# Patient Record
Sex: FEMALE | Race: WHITE | Employment: OTHER | URBAN - METROPOLITAN AREA
[De-identification: names, ages, dates, MRNs, and addresses within clinical notes are randomized per-mention and may not be internally consistent; named-entity substitution may affect disease eponyms.]

---

## 2017-10-31 PROBLEM — M48.02 CERVICAL STENOSIS OF SPINAL CANAL: Status: ACTIVE | Noted: 2017-10-31

## 2017-10-31 PROBLEM — M54.12 CERVICAL RADICULOPATHY: Status: ACTIVE | Noted: 2017-10-31

## 2017-11-10 ENCOUNTER — HOSPITAL ENCOUNTER (OUTPATIENT)
Dept: SURGERY | Age: 37
Discharge: HOME | End: 2017-11-10
Payer: COMMERCIAL

## 2017-11-10 VITALS
OXYGEN SATURATION: 98 % | RESPIRATION RATE: 16 BRPM | HEIGHT: 64 IN | DIASTOLIC BLOOD PRESSURE: 69 MMHG | SYSTOLIC BLOOD PRESSURE: 109 MMHG | HEART RATE: 77 BPM | BODY MASS INDEX: 23.27 KG/M2 | WEIGHT: 136.3 LBS | TEMPERATURE: 98.2 F

## 2017-11-10 LAB
ANION GAP SERPL CALC-SCNC: 7 MMOL/L (ref 7–16)
APPEARANCE UR: CLEAR
BACTERIA SPEC CULT: NORMAL
BASOPHILS # BLD: 0 K/UL (ref 0–0.2)
BASOPHILS NFR BLD: 0 % (ref 0–2)
BILIRUB UR QL: NEGATIVE
BUN SERPL-MCNC: 15 MG/DL (ref 6–23)
CALCIUM SERPL-MCNC: 8.1 MG/DL (ref 8.3–10.4)
CHLORIDE SERPL-SCNC: 107 MMOL/L (ref 98–107)
CO2 SERPL-SCNC: 29 MMOL/L (ref 21–32)
COLOR UR: YELLOW
CREAT SERPL-MCNC: 0.83 MG/DL (ref 0.6–1)
DIFFERENTIAL METHOD BLD: NORMAL
EOSINOPHIL # BLD: 0.1 K/UL (ref 0–0.8)
EOSINOPHIL NFR BLD: 2 % (ref 0.5–7.8)
ERYTHROCYTE [DISTWIDTH] IN BLOOD BY AUTOMATED COUNT: 12.2 % (ref 11.9–14.6)
GLUCOSE SERPL-MCNC: 83 MG/DL (ref 65–100)
GLUCOSE UR STRIP.AUTO-MCNC: NEGATIVE MG/DL
HCT VFR BLD AUTO: 40 % (ref 35.8–46.3)
HGB BLD-MCNC: 13.4 G/DL (ref 11.7–15.4)
HGB UR QL STRIP: NEGATIVE
IMM GRANULOCYTES # BLD: 0 K/UL (ref 0–0.5)
IMM GRANULOCYTES NFR BLD AUTO: 0 % (ref 0–5)
KETONES UR QL STRIP.AUTO: NEGATIVE MG/DL
LEUKOCYTE ESTERASE UR QL STRIP.AUTO: NEGATIVE
LYMPHOCYTES # BLD: 2.4 K/UL (ref 0.5–4.6)
LYMPHOCYTES NFR BLD: 40 % (ref 13–44)
MCH RBC QN AUTO: 30.3 PG (ref 26.1–32.9)
MCHC RBC AUTO-ENTMCNC: 33.5 G/DL (ref 31.4–35)
MCV RBC AUTO: 90.5 FL (ref 79.6–97.8)
MONOCYTES # BLD: 0.4 K/UL (ref 0.1–1.3)
MONOCYTES NFR BLD: 6 % (ref 4–12)
NEUTS SEG # BLD: 3.1 K/UL (ref 1.7–8.2)
NEUTS SEG NFR BLD: 52 % (ref 43–78)
NITRITE UR QL STRIP.AUTO: NEGATIVE
PH UR STRIP: 7 [PH] (ref 5–9)
PLATELET # BLD AUTO: 209 K/UL (ref 150–450)
PMV BLD AUTO: 11.2 FL (ref 10.8–14.1)
POTASSIUM SERPL-SCNC: 4.2 MMOL/L (ref 3.5–5.1)
PROT UR STRIP-MCNC: NEGATIVE MG/DL
RBC # BLD AUTO: 4.42 M/UL (ref 4.05–5.25)
SERVICE CMNT-IMP: NORMAL
SODIUM SERPL-SCNC: 143 MMOL/L (ref 136–145)
SP GR UR REFRACTOMETRY: 1.02 (ref 1–1.02)
UROBILINOGEN UR QL STRIP.AUTO: 0.2 EU/DL (ref 0.2–1)
WBC # BLD AUTO: 6 K/UL (ref 4.3–11.1)

## 2017-11-10 PROCEDURE — 87641 MR-STAPH DNA AMP PROBE: CPT | Performed by: NEUROLOGICAL SURGERY

## 2017-11-10 PROCEDURE — 85025 COMPLETE CBC W/AUTO DIFF WBC: CPT | Performed by: NEUROLOGICAL SURGERY

## 2017-11-10 PROCEDURE — 80048 BASIC METABOLIC PNL TOTAL CA: CPT | Performed by: NEUROLOGICAL SURGERY

## 2017-11-10 PROCEDURE — 81003 URINALYSIS AUTO W/O SCOPE: CPT | Performed by: NEUROLOGICAL SURGERY

## 2017-11-10 PROCEDURE — 77030027138 HC INCENT SPIROMETER -A

## 2017-11-10 RX ORDER — IBUPROFEN 200 MG
200 TABLET ORAL AS NEEDED
COMMUNITY

## 2017-11-10 NOTE — PERIOP NOTES
Patient verified name, , and surgery as listed in [facility]. TYPE  CASE:2              Orders:  received  Labs per Spine protocol:  CBC/BMP/UA/MRSA   Labs per anesthesia protocol: None needed   EKG/cardiac records  :  None needed     Instructed patient to continue previous medications as prescribed prior to surgery and  to take the following medications the day of surgery according to anesthesia guidelines with a small sip of water, med bottles NOT visualized : NONE       Continue all previous medications unless otherwise directed. Instructed patient to hold  the following medications prior to surgery: IBUPROFEN, ALL VITAMINS/SUPPLEMENTS     Pt viewed Spine Pre-hab video. All further questions were addressed. Pt was provided with antibacterial soap, Hibiclens, long-handled sponge, Spine Recovery booklet and incentive spirometer. Pt correctly demonstrated use of incentive spirometer and instruction to bring it to the hospital on day of surgery. Handouts and all Surgery instructions provided to pt and pt verbalizes understanding. Patient instructed on the following and verbalized understanding:  Arrive at MAIN entrance, time of arrival to be called the day before by 1700. Responsible adult must drive patient to and from hospital, stay during surgery and 24 hours postoperatively. Npo after midnight including gum, mints and ice chips. Use hibiclens in the shower the night before and the morning of surgery. Leave all valuables at home. Instructed on no jewelry or body piercings on the dos. Bring insurance card and ID. No perfumes, oil, powder, colognes, makeup or  lotions on the skin. Patient verbalized understanding of all instructions and provided all medical/health information to the best of their ability.        Pt teach back was successful and pt demonstrates knowledge of instruction

## 2017-11-10 NOTE — PERIOP NOTES
Recent Results (from the past 12 hour(s))   URINALYSIS W/ RFLX MICROSCOPIC    Collection Time: 11/10/17 10:08 AM   Result Value Ref Range    Color YELLOW      Appearance CLEAR      Specific gravity 1.025 (H) 1.001 - 1.023      pH (UA) 7.0 5.0 - 9.0      Protein NEGATIVE  NEG mg/dL    Glucose NEGATIVE  mg/dL    Ketone NEGATIVE  NEG mg/dL    Bilirubin NEGATIVE  NEG      Blood NEGATIVE  NEG      Urobilinogen 0.2 0.2 - 1.0 EU/dL    Nitrites NEGATIVE  NEG      Leukocyte Esterase NEGATIVE  NEG     CBC WITH AUTOMATED DIFF    Collection Time: 11/10/17 10:21 AM   Result Value Ref Range    WBC 6.0 4.3 - 11.1 K/uL    RBC 4.42 4.05 - 5.25 M/uL    HGB 13.4 11.7 - 15.4 g/dL    HCT 40.0 35.8 - 46.3 %    MCV 90.5 79.6 - 97.8 FL    MCH 30.3 26.1 - 32.9 PG    MCHC 33.5 31.4 - 35.0 g/dL    RDW 12.2 11.9 - 14.6 %    PLATELET 116 590 - 335 K/uL    MPV 11.2 10.8 - 14.1 FL    DF AUTOMATED      NEUTROPHILS 52 43 - 78 %    LYMPHOCYTES 40 13 - 44 %    MONOCYTES 6 4.0 - 12.0 %    EOSINOPHILS 2 0.5 - 7.8 %    BASOPHILS 0 0.0 - 2.0 %    IMMATURE GRANULOCYTES 0 0.0 - 5.0 %    ABS. NEUTROPHILS 3.1 1.7 - 8.2 K/UL    ABS. LYMPHOCYTES 2.4 0.5 - 4.6 K/UL    ABS. MONOCYTES 0.4 0.1 - 1.3 K/UL    ABS. EOSINOPHILS 0.1 0.0 - 0.8 K/UL    ABS. BASOPHILS 0.0 0.0 - 0.2 K/UL    ABS. IMM. GRANS. 0.0 0.0 - 0.5 K/UL   METABOLIC PANEL, BASIC    Collection Time: 11/10/17 10:21 AM   Result Value Ref Range    Sodium 143 136 - 145 mmol/L    Potassium 4.2 3.5 - 5.1 mmol/L    Chloride 107 98 - 107 mmol/L    CO2 29 21 - 32 mmol/L    Anion gap 7 7 - 16 mmol/L    Glucose 83 65 - 100 mg/dL    BUN 15 6 - 23 MG/DL    Creatinine 0.83 0.6 - 1.0 MG/DL    GFR est AA >60 >60 ml/min/1.73m2    GFR est non-AA >60 >60 ml/min/1.73m2    Calcium 8.1 (L) 8.3 - 10.4 MG/DL   MSSA/MRSA SC BY PCR, NASAL SWAB    Collection Time: 11/10/17 10:21 AM   Result Value Ref Range    Special Requests: NO SPECIAL REQUESTS      Culture result:        SA target not detected. A MRSA NEGATIVE, SA NEGATIVE test result does not preclude MRSA or SA nasal colonization.

## 2017-11-14 ENCOUNTER — ANESTHESIA EVENT (OUTPATIENT)
Dept: SURGERY | Age: 37
End: 2017-11-14
Payer: COMMERCIAL

## 2017-11-15 ENCOUNTER — HOSPITAL ENCOUNTER (OUTPATIENT)
Age: 37
Discharge: HOME | End: 2017-11-16
Attending: NEUROLOGICAL SURGERY | Admitting: NEUROLOGICAL SURGERY
Payer: COMMERCIAL

## 2017-11-15 ENCOUNTER — APPOINTMENT (OUTPATIENT)
Dept: GENERAL RADIOLOGY | Age: 37
End: 2017-11-15
Attending: NEUROLOGICAL SURGERY
Payer: COMMERCIAL

## 2017-11-15 ENCOUNTER — ANESTHESIA (OUTPATIENT)
Dept: SURGERY | Age: 37
End: 2017-11-15
Payer: COMMERCIAL

## 2017-11-15 DIAGNOSIS — M48.02 CERVICAL STENOSIS OF SPINAL CANAL: Primary | ICD-10-CM

## 2017-11-15 PROCEDURE — 74011000250 HC RX REV CODE- 250: Performed by: ANESTHESIOLOGY

## 2017-11-15 PROCEDURE — 77030004391 HC BUR FLUT MEDT -C: Performed by: NEUROLOGICAL SURGERY

## 2017-11-15 PROCEDURE — 74011000250 HC RX REV CODE- 250

## 2017-11-15 PROCEDURE — 77030009868 HC PIN DISTR CASPR AESC -B: Performed by: NEUROLOGICAL SURGERY

## 2017-11-15 PROCEDURE — 77030026405 HC CGE ANT CERV ROIC ZIMM -H: Performed by: NEUROLOGICAL SURGERY

## 2017-11-15 PROCEDURE — 74011250636 HC RX REV CODE- 250/636

## 2017-11-15 PROCEDURE — 77030008703 HC TU ET UNCUF COVD -A: Performed by: ANESTHESIOLOGY

## 2017-11-15 PROCEDURE — 76210000004 HC OR PH I REC 4.5 TO 5 HR: Performed by: NEUROLOGICAL SURGERY

## 2017-11-15 PROCEDURE — 88304 TISSUE EXAM BY PATHOLOGIST: CPT | Performed by: NEUROLOGICAL SURGERY

## 2017-11-15 PROCEDURE — 77030018390 HC SPNG HEMSTAT2 J&J -B: Performed by: NEUROLOGICAL SURGERY

## 2017-11-15 PROCEDURE — 77030010507 HC ADH SKN DERMBND J&J -B: Performed by: NEUROLOGICAL SURGERY

## 2017-11-15 PROCEDURE — 74011250636 HC RX REV CODE- 250/636: Performed by: ANESTHESIOLOGY

## 2017-11-15 PROCEDURE — C1713 ANCHOR/SCREW BN/BN,TIS/BN: HCPCS | Performed by: NEUROLOGICAL SURGERY

## 2017-11-15 PROCEDURE — 74011250636 HC RX REV CODE- 250/636: Performed by: NEUROLOGICAL SURGERY

## 2017-11-15 PROCEDURE — 74011000250 HC RX REV CODE- 250: Performed by: NEUROLOGICAL SURGERY

## 2017-11-15 PROCEDURE — 77030011640 HC PAD GRND REM COVD -A: Performed by: NEUROLOGICAL SURGERY

## 2017-11-15 PROCEDURE — 77030018836 HC SOL IRR NACL ICUM -A: Performed by: NEUROLOGICAL SURGERY

## 2017-11-15 PROCEDURE — 77030003666 HC NDL SPINAL BD -A: Performed by: NEUROLOGICAL SURGERY

## 2017-11-15 PROCEDURE — 76010000161 HC OR TIME 1 TO 1.5 HR INTENSV-TIER 1: Performed by: NEUROLOGICAL SURGERY

## 2017-11-15 PROCEDURE — 77030012894: Performed by: NEUROLOGICAL SURGERY

## 2017-11-15 PROCEDURE — 74011250637 HC RX REV CODE- 250/637: Performed by: NEUROLOGICAL SURGERY

## 2017-11-15 PROCEDURE — 77030028271 HC SRGFL HEMSTAT MTRX KT J&J -C: Performed by: NEUROLOGICAL SURGERY

## 2017-11-15 PROCEDURE — 77030019908 HC STETH ESOPH SIMS -A: Performed by: ANESTHESIOLOGY

## 2017-11-15 PROCEDURE — 77030032490 HC SLV COMPR SCD KNE COVD -B: Performed by: NEUROLOGICAL SURGERY

## 2017-11-15 PROCEDURE — 76060000034 HC ANESTHESIA 1.5 TO 2 HR: Performed by: NEUROLOGICAL SURGERY

## 2017-11-15 PROCEDURE — 72040 X-RAY EXAM NECK SPINE 2-3 VW: CPT

## 2017-11-15 PROCEDURE — 74011250637 HC RX REV CODE- 250/637: Performed by: ANESTHESIOLOGY

## 2017-11-15 PROCEDURE — 77030011267 HC ELECTRD BLD COVD -A: Performed by: NEUROLOGICAL SURGERY

## 2017-11-15 PROCEDURE — 77030020782 HC GWN BAIR PAWS FLX 3M -B: Performed by: ANESTHESIOLOGY

## 2017-11-15 PROCEDURE — 77030003029 HC SUT VCRL J&J -B: Performed by: NEUROLOGICAL SURGERY

## 2017-11-15 PROCEDURE — 77030030163 HC BN WAX J&J -A: Performed by: NEUROLOGICAL SURGERY

## 2017-11-15 PROCEDURE — 77030008477 HC STYL SATN SLP COVD -A: Performed by: ANESTHESIOLOGY

## 2017-11-15 DEVICE — CAGE SPNL W14XH7MM D12MM 0.41ML PUR LORD CERV INTBDY FUS: Type: IMPLANTABLE DEVICE | Site: SPINE CERVICAL | Status: FUNCTIONAL

## 2017-11-15 DEVICE — PLATE SPNL H5-7MM 6DEG STD PEEK TI ANAT CRV FOR DISCECTOMY: Type: IMPLANTABLE DEVICE | Site: SPINE CERVICAL | Status: FUNCTIONAL

## 2017-11-15 DEVICE — GRAFT BNE SUB 1CC 2MM GRAN ALLOGENIC MORPHOGENETIC PROT W: Type: IMPLANTABLE DEVICE | Site: SPINE CERVICAL | Status: FUNCTIONAL

## 2017-11-15 RX ORDER — HYDROMORPHONE HYDROCHLORIDE 2 MG/ML
0.5 INJECTION, SOLUTION INTRAMUSCULAR; INTRAVENOUS; SUBCUTANEOUS
Status: COMPLETED | OUTPATIENT
Start: 2017-11-15 | End: 2017-11-15

## 2017-11-15 RX ORDER — MIDAZOLAM HYDROCHLORIDE 1 MG/ML
5 INJECTION, SOLUTION INTRAMUSCULAR; INTRAVENOUS ONCE
Status: DISCONTINUED | OUTPATIENT
Start: 2017-11-15 | End: 2017-11-15 | Stop reason: HOSPADM

## 2017-11-15 RX ORDER — ROCURONIUM BROMIDE 10 MG/ML
INJECTION, SOLUTION INTRAVENOUS AS NEEDED
Status: DISCONTINUED | OUTPATIENT
Start: 2017-11-15 | End: 2017-11-15 | Stop reason: HOSPADM

## 2017-11-15 RX ORDER — HYDROMORPHONE HYDROCHLORIDE 1 MG/ML
0.5 INJECTION, SOLUTION INTRAMUSCULAR; INTRAVENOUS; SUBCUTANEOUS
Status: COMPLETED | OUTPATIENT
Start: 2017-11-15 | End: 2017-11-15

## 2017-11-15 RX ORDER — HYDROMORPHONE HYDROCHLORIDE 1 MG/ML
2 INJECTION, SOLUTION INTRAMUSCULAR; INTRAVENOUS; SUBCUTANEOUS
Status: DISCONTINUED | OUTPATIENT
Start: 2017-11-15 | End: 2017-11-16 | Stop reason: HOSPADM

## 2017-11-15 RX ORDER — DEXAMETHASONE SODIUM PHOSPHATE 100 MG/10ML
10 INJECTION INTRAMUSCULAR; INTRAVENOUS ONCE
Status: COMPLETED | OUTPATIENT
Start: 2017-11-15 | End: 2017-11-15

## 2017-11-15 RX ORDER — PROPOFOL 10 MG/ML
INJECTION, EMULSION INTRAVENOUS AS NEEDED
Status: DISCONTINUED | OUTPATIENT
Start: 2017-11-15 | End: 2017-11-15 | Stop reason: HOSPADM

## 2017-11-15 RX ORDER — MIDAZOLAM HYDROCHLORIDE 1 MG/ML
2 INJECTION, SOLUTION INTRAMUSCULAR; INTRAVENOUS
Status: DISCONTINUED | OUTPATIENT
Start: 2017-11-15 | End: 2017-11-15 | Stop reason: HOSPADM

## 2017-11-15 RX ORDER — OXYCODONE HYDROCHLORIDE 5 MG/1
5 TABLET ORAL
Status: DISCONTINUED | OUTPATIENT
Start: 2017-11-15 | End: 2017-11-15 | Stop reason: HOSPADM

## 2017-11-15 RX ORDER — GLYCOPYRROLATE 0.2 MG/ML
INJECTION INTRAMUSCULAR; INTRAVENOUS AS NEEDED
Status: DISCONTINUED | OUTPATIENT
Start: 2017-11-15 | End: 2017-11-15 | Stop reason: HOSPADM

## 2017-11-15 RX ORDER — LIDOCAINE HYDROCHLORIDE 20 MG/ML
INJECTION, SOLUTION EPIDURAL; INFILTRATION; INTRACAUDAL; PERINEURAL AS NEEDED
Status: DISCONTINUED | OUTPATIENT
Start: 2017-11-15 | End: 2017-11-15 | Stop reason: HOSPADM

## 2017-11-15 RX ORDER — SODIUM CHLORIDE, SODIUM LACTATE, POTASSIUM CHLORIDE, CALCIUM CHLORIDE 600; 310; 30; 20 MG/100ML; MG/100ML; MG/100ML; MG/100ML
75 INJECTION, SOLUTION INTRAVENOUS CONTINUOUS
Status: DISCONTINUED | OUTPATIENT
Start: 2017-11-15 | End: 2017-11-16 | Stop reason: HOSPADM

## 2017-11-15 RX ORDER — NEOSTIGMINE METHYLSULFATE 1 MG/ML
INJECTION INTRAVENOUS AS NEEDED
Status: DISCONTINUED | OUTPATIENT
Start: 2017-11-15 | End: 2017-11-15 | Stop reason: HOSPADM

## 2017-11-15 RX ORDER — SODIUM CHLORIDE, SODIUM LACTATE, POTASSIUM CHLORIDE, CALCIUM CHLORIDE 600; 310; 30; 20 MG/100ML; MG/100ML; MG/100ML; MG/100ML
75 INJECTION, SOLUTION INTRAVENOUS CONTINUOUS
Status: DISCONTINUED | OUTPATIENT
Start: 2017-11-15 | End: 2017-11-15 | Stop reason: HOSPADM

## 2017-11-15 RX ORDER — LIDOCAINE HYDROCHLORIDE 10 MG/ML
0.1 INJECTION INFILTRATION; PERINEURAL AS NEEDED
Status: DISCONTINUED | OUTPATIENT
Start: 2017-11-15 | End: 2017-11-15 | Stop reason: HOSPADM

## 2017-11-15 RX ORDER — OXYCODONE AND ACETAMINOPHEN 10; 325 MG/1; MG/1
1 TABLET ORAL
Status: DISCONTINUED | OUTPATIENT
Start: 2017-11-15 | End: 2017-11-16 | Stop reason: HOSPADM

## 2017-11-15 RX ORDER — ZOLPIDEM TARTRATE 5 MG/1
5 TABLET ORAL
Status: DISCONTINUED | OUTPATIENT
Start: 2017-11-15 | End: 2017-11-16 | Stop reason: HOSPADM

## 2017-11-15 RX ORDER — SODIUM CHLORIDE 0.9 % (FLUSH) 0.9 %
5-10 SYRINGE (ML) INJECTION AS NEEDED
Status: DISCONTINUED | OUTPATIENT
Start: 2017-11-15 | End: 2017-11-16 | Stop reason: HOSPADM

## 2017-11-15 RX ORDER — OXYCODONE AND ACETAMINOPHEN 7.5; 325 MG/1; MG/1
1 TABLET ORAL
Qty: 30 TAB | Refills: 0 | Status: SHIPPED | OUTPATIENT
Start: 2017-11-15

## 2017-11-15 RX ORDER — ACETAMINOPHEN 325 MG/1
650 TABLET ORAL
Status: DISCONTINUED | OUTPATIENT
Start: 2017-11-15 | End: 2017-11-16 | Stop reason: HOSPADM

## 2017-11-15 RX ORDER — ACETAMINOPHEN 10 MG/ML
1000 INJECTION, SOLUTION INTRAVENOUS ONCE
Status: COMPLETED | OUTPATIENT
Start: 2017-11-15 | End: 2017-11-15

## 2017-11-15 RX ORDER — SODIUM CHLORIDE 0.9 % (FLUSH) 0.9 %
5-10 SYRINGE (ML) INJECTION EVERY 8 HOURS
Status: DISCONTINUED | OUTPATIENT
Start: 2017-11-15 | End: 2017-11-16 | Stop reason: HOSPADM

## 2017-11-15 RX ORDER — FENTANYL CITRATE 50 UG/ML
INJECTION, SOLUTION INTRAMUSCULAR; INTRAVENOUS AS NEEDED
Status: DISCONTINUED | OUTPATIENT
Start: 2017-11-15 | End: 2017-11-15 | Stop reason: HOSPADM

## 2017-11-15 RX ORDER — LIDOCAINE HYDROCHLORIDE AND EPINEPHRINE 5; 5 MG/ML; UG/ML
INJECTION, SOLUTION INFILTRATION; PERINEURAL AS NEEDED
Status: DISCONTINUED | OUTPATIENT
Start: 2017-11-15 | End: 2017-11-15 | Stop reason: HOSPADM

## 2017-11-15 RX ORDER — FENTANYL CITRATE 50 UG/ML
100 INJECTION, SOLUTION INTRAMUSCULAR; INTRAVENOUS ONCE
Status: DISCONTINUED | OUTPATIENT
Start: 2017-11-15 | End: 2017-11-15 | Stop reason: HOSPADM

## 2017-11-15 RX ORDER — ONDANSETRON 2 MG/ML
INJECTION INTRAMUSCULAR; INTRAVENOUS AS NEEDED
Status: DISCONTINUED | OUTPATIENT
Start: 2017-11-15 | End: 2017-11-15 | Stop reason: HOSPADM

## 2017-11-15 RX ORDER — HYDROCODONE BITARTRATE AND ACETAMINOPHEN 5; 325 MG/1; MG/1
2 TABLET ORAL AS NEEDED
Status: DISCONTINUED | OUTPATIENT
Start: 2017-11-15 | End: 2017-11-15 | Stop reason: HOSPADM

## 2017-11-15 RX ORDER — FAMOTIDINE 20 MG/1
20 TABLET, FILM COATED ORAL ONCE
Status: COMPLETED | OUTPATIENT
Start: 2017-11-15 | End: 2017-11-15

## 2017-11-15 RX ORDER — CEFAZOLIN SODIUM IN 0.9 % NACL 2 G/50 ML
2 INTRAVENOUS SOLUTION, PIGGYBACK (ML) INTRAVENOUS EVERY 8 HOURS
Status: COMPLETED | OUTPATIENT
Start: 2017-11-15 | End: 2017-11-16

## 2017-11-15 RX ORDER — CEFAZOLIN SODIUM IN 0.9 % NACL 2 G/50 ML
2 INTRAVENOUS SOLUTION, PIGGYBACK (ML) INTRAVENOUS ONCE
Status: COMPLETED | OUTPATIENT
Start: 2017-11-15 | End: 2017-11-15

## 2017-11-15 RX ADMIN — SODIUM CHLORIDE, SODIUM LACTATE, POTASSIUM CHLORIDE, AND CALCIUM CHLORIDE 75 ML/HR: 600; 310; 30; 20 INJECTION, SOLUTION INTRAVENOUS at 17:59

## 2017-11-15 RX ADMIN — ROCURONIUM BROMIDE 30 MG: 10 INJECTION, SOLUTION INTRAVENOUS at 10:50

## 2017-11-15 RX ADMIN — HYDROMORPHONE HYDROCHLORIDE 0.5 MG: 2 INJECTION, SOLUTION INTRAMUSCULAR; INTRAVENOUS; SUBCUTANEOUS at 12:46

## 2017-11-15 RX ADMIN — NEOSTIGMINE METHYLSULFATE 3 MG: 1 INJECTION INTRAVENOUS at 12:05

## 2017-11-15 RX ADMIN — ONDANSETRON 4 MG: 2 INJECTION INTRAMUSCULAR; INTRAVENOUS at 11:14

## 2017-11-15 RX ADMIN — PROMETHAZINE HYDROCHLORIDE 6.25 MG: 25 INJECTION INTRAMUSCULAR; INTRAVENOUS at 13:47

## 2017-11-15 RX ADMIN — FAMOTIDINE 20 MG: 20 TABLET, FILM COATED ORAL at 09:13

## 2017-11-15 RX ADMIN — FENTANYL CITRATE 50 MCG: 50 INJECTION, SOLUTION INTRAMUSCULAR; INTRAVENOUS at 11:12

## 2017-11-15 RX ADMIN — ROCURONIUM BROMIDE 10 MG: 10 INJECTION, SOLUTION INTRAVENOUS at 11:23

## 2017-11-15 RX ADMIN — MIDAZOLAM HYDROCHLORIDE 2 MG: 1 INJECTION, SOLUTION INTRAMUSCULAR; INTRAVENOUS at 09:22

## 2017-11-15 RX ADMIN — DEXAMETHASONE SODIUM PHOSPHATE 10 MG: 10 INJECTION INTRAMUSCULAR; INTRAVENOUS at 09:13

## 2017-11-15 RX ADMIN — HYDROMORPHONE HYDROCHLORIDE 0.5 MG: 1 INJECTION, SOLUTION INTRAMUSCULAR; INTRAVENOUS; SUBCUTANEOUS at 13:24

## 2017-11-15 RX ADMIN — HYDROMORPHONE HYDROCHLORIDE 2 MG: 1 INJECTION, SOLUTION INTRAMUSCULAR; INTRAVENOUS; SUBCUTANEOUS at 17:59

## 2017-11-15 RX ADMIN — GLYCOPYRROLATE 0.4 MG: 0.2 INJECTION INTRAMUSCULAR; INTRAVENOUS at 12:05

## 2017-11-15 RX ADMIN — CEFAZOLIN 2 G: 1 INJECTION, POWDER, FOR SOLUTION INTRAMUSCULAR; INTRAVENOUS; PARENTERAL at 11:00

## 2017-11-15 RX ADMIN — LIDOCAINE HYDROCHLORIDE 100 MG: 20 INJECTION, SOLUTION EPIDURAL; INFILTRATION; INTRACAUDAL; PERINEURAL at 10:50

## 2017-11-15 RX ADMIN — Medication 10 ML: at 22:27

## 2017-11-15 RX ADMIN — ACETAMINOPHEN 1000 MG: 10 INJECTION, SOLUTION INTRAVENOUS at 12:59

## 2017-11-15 RX ADMIN — HYDROMORPHONE HYDROCHLORIDE 0.5 MG: 2 INJECTION, SOLUTION INTRAMUSCULAR; INTRAVENOUS; SUBCUTANEOUS at 12:55

## 2017-11-15 RX ADMIN — SODIUM CHLORIDE, SODIUM LACTATE, POTASSIUM CHLORIDE, AND CALCIUM CHLORIDE: 600; 310; 30; 20 INJECTION, SOLUTION INTRAVENOUS at 12:00

## 2017-11-15 RX ADMIN — OXYCODONE HYDROCHLORIDE AND ACETAMINOPHEN 1 TABLET: 10; 325 TABLET ORAL at 22:26

## 2017-11-15 RX ADMIN — HYDROMORPHONE HYDROCHLORIDE 0.5 MG: 2 INJECTION, SOLUTION INTRAMUSCULAR; INTRAVENOUS; SUBCUTANEOUS at 12:33

## 2017-11-15 RX ADMIN — CEFAZOLIN 2 G: 1 INJECTION, POWDER, FOR SOLUTION INTRAMUSCULAR; INTRAVENOUS; PARENTERAL at 17:58

## 2017-11-15 RX ADMIN — FENTANYL CITRATE 50 MCG: 50 INJECTION, SOLUTION INTRAMUSCULAR; INTRAVENOUS at 10:50

## 2017-11-15 RX ADMIN — HYDROMORPHONE HYDROCHLORIDE 0.5 MG: 1 INJECTION, SOLUTION INTRAMUSCULAR; INTRAVENOUS; SUBCUTANEOUS at 13:53

## 2017-11-15 RX ADMIN — SODIUM CHLORIDE, SODIUM LACTATE, POTASSIUM CHLORIDE, AND CALCIUM CHLORIDE 75 ML/HR: 600; 310; 30; 20 INJECTION, SOLUTION INTRAVENOUS at 09:11

## 2017-11-15 RX ADMIN — Medication 10 ML: at 18:05

## 2017-11-15 RX ADMIN — ROCURONIUM BROMIDE 10 MG: 10 INJECTION, SOLUTION INTRAVENOUS at 11:09

## 2017-11-15 RX ADMIN — PROPOFOL 200 MG: 10 INJECTION, EMULSION INTRAVENOUS at 10:50

## 2017-11-15 RX ADMIN — HYDROMORPHONE HYDROCHLORIDE 0.5 MG: 2 INJECTION, SOLUTION INTRAMUSCULAR; INTRAVENOUS; SUBCUTANEOUS at 12:27

## 2017-11-15 NOTE — ANESTHESIA PREPROCEDURE EVALUATION
Anesthetic History     PONV          Review of Systems / Medical History  Patient summary reviewed and pertinent labs reviewed    Pulmonary  Within defined limits                 Neuro/Psych   Within defined limits           Cardiovascular  Within defined limits                Exercise tolerance: >4 METS     GI/Hepatic/Renal  Within defined limits              Endo/Other  Within defined limits           Other Findings            Physical Exam    Airway  Mallampati: II  TM Distance: 4 - 6 cm  Neck ROM: normal range of motion   Mouth opening: Normal     Cardiovascular  Regular rate and rhythm,  S1 and S2 normal,  no murmur, click, rub, or gallop             Dental  No notable dental hx       Pulmonary  Breath sounds clear to auscultation               Abdominal  GI exam deferred       Other Findings            Anesthetic Plan    ASA: 1  Anesthesia type: general          Induction: Intravenous  Anesthetic plan and risks discussed with: Patient

## 2017-11-15 NOTE — OP NOTES
[facility]   OPERATIVE REPORT       Name:  Ian Ivy   MR#:  [id number]   :  1980   Account #:  [de-identified]   Date of Adm:  11/15/2017       DATE OF SURGERY: 11/15/2017      PREOPERATIVE DIAGNOSES: Left C6 radiculopathy secondary to   stenosis and disk herniation. POSTOPERATIVE DIAGNOSES: Left C6 radiculopathy secondary to   stenosis and disk herniation. OPERATIONS AND PROCEDURES:   1. Anterior cervical spine total diskectomy with decompression   of the spinal cord and nerve roots, C5-6.   2. Anterior cervical spine interbody fusion with LDR SAVANNAH-C cage,   OsteoAMP and bone marrow aspirate, C5-6.   3. Anterior cervical spine instrumentation with LDR plating, C5-  6.   4. Bone marrow aspiration, C6 vertebra. 5. Continuous intraoperative fluoroscopy. SURGEON: Kate Gilliland. Celestino Forrest MD    ANESTHESIA: General endotracheal.    ESTIMATED BLOOD LOSS: Minimal.    PREPARATION: ChloraPrep. COMPLICATIONS: None. HISTORY OF PRESENT ILLNESS: A 42-year-old lady with intractable   upper extremity pain and weakness, primarily on the left side,   refractory to conservative measures. MRI scanning was positive   for spinal stenosis and disk herniation with cord and root   compression on the left at C5-6 and the patient was admitted for   surgery as conservative measures had failed. OPERATIVE NOTE: The patient was brought to the operating room,   carefully placed under general endotracheal anesthesia without   complications, placed supine with a roll under the shoulder   blades, the head gently extended on a Bowen pillow. The right   side of the neck was marked at the C5-6 level, confirmed by   fluoroscopy and the entire region was prepped and draped in the   usual sterile fashion. A horizontal transverse incision was made   with a 15 blade and carried sharply through the platysma.  Using   rostral to caudal subplatysmal dissection, a plane was created   between the carotid artery laterally and trachea and esophagus   medially. Hand-held retractors were placed. The longus colli   muscles were stripped bilaterally. Lateral C-arm fluoroscopy   confirmed an 18-gauge spinal needle inserted correctly into C5-  6. The spinal needle was removed. The disk space was opened   widely with a 15 blade and cleaned with pituitary rongeurs until   clear and a large volume of pathological disk material was   removed. At this point, the [facility] distracting pin was placed   into the C6 vertebra and using a 3 mL syringe and 14 gauge   Angiocath, approximately 3 mL of bone marrow were aspirated and   mixed with OsteoAMP on the back table. The hole in the bone was   plugged with Nu-Knit Surgicel and bone wax. Next, the [employer] AM8 drill was used and the endplates of C5   and C6 were drilled down to the posterior ligament which was   opened with an arachnoid knife and 1 mm Kerrison rongeur. The ligamentum was opened and the dura was identified. Lateral   recess stenosis was relieved with a 1 mm Kerrison rongeur. After   completion, a ball-tipped probe was passed along the nerve roots   bilaterally without further compression. The wound was irrigated   until clear. A #7 LDR lordotic trial was tightly placed in the   disk space, confirmed by fluoroscopy to be in good position. A   #7 LDR lordotic cage was packed with OsteoAMP and bone marrow   aspirate and countersunk to a nice, tight fit. LDR plates were   engaged first inferiorly and then superiorly with 1 and 2-mm   taps and a mallet. At this point, the wound was irrigated with   thrombin and during this time, AP and lateral x-ray were   obtained which confirmed good position of the graft and   hardware. Additional thrombin was placed. No further bleeding   was encountered. The wound was dry and it was closed. The   platysma was closed tightly with interrupted 3-0 Vicryl,   subcutaneous tissues closed with interrupted 3-0 Vicryl.  Skin   was closed with Dermabond. The patient tolerated the procedure   well, was awakened, extubated, and taken to PACU in stable   condition. There were no obvious complications. DISCHARGE INSTRUCTIONS: Diet regular. Activity up as tolerated. No heavy lifting, bending, or automobile driving. Showers are   permissible, but no baths. The patient will contact the   physician if she develops any fever, drainage, swelling,   cellulitis, or neurological change. Return visit in 10-14 days   for wound check. DISCHARGE MEDICATIONS: Included admission medicines plus   Percocet 7.5/325 q.8h. p.r.n. DISCHARGE CONDITION: Satisfactory.         MD RONNI Delaney / Juana Limon   D:  11/15/2017   12:10   T:  11/15/2017   13:07   Job #:  [id number]

## 2017-11-15 NOTE — BRIEF OP NOTE
BRIEF OPERATIVE NOTE    Date of Procedure: 11/15/2017   Preoperative Diagnosis: Cervical spinal stenosis [M48.02]  Cervical radiculopathy [M54.12]  Postoperative Diagnosis: Cervical spinal stenosis [M48.02]  Cervical radiculopathy [M54.12]    Procedure(s):  ACDF C5-6  Surgeon(s) and Role:     * Davey Licona MD - Primary         Assistant Staff:  Nurse Practitioner: Mai Devi NP    Surgical Staff:  Circ-1: Pat Esteban RN  Radiology Technician: Maddy Kohler, RT, R, CT; Brando Lucas RT, R  Scrub Tech-1: Madan Le  Scrub Private/Assistant: Dejan Foote  Nurse Practitioner: Mai Devi NP  Event Time In   Incision Start 1110   Incision Close 1205     Anesthesia: General   Estimated Blood Loss: minimal  Specimens:   ID Type Source Tests Collected by Time Destination   1 : C5-6 Disc Material  Routine Disc Material  Davey Licona MD 11/15/2017 1153 Pathology      Findings: stenosis  Complications: none  Implants:   Implant Name Type Inv.  Item Serial No.  Lot No. LRB No. Used Action   GRAFT BNE GRAN 1ML -- OSTEOAMP - [id number]  GRAFT BNE GRAN 1ML -- [facility] [id number] N/A 1 Implanted   CAGE CERV LORDTC [id number] -- SAVANNAH-C - [id number]  CAGE CERV LORDTC [id number] -- SAVANNAH-C  [employer] [id number] N/A 1 Implanted   PLATE ANT ANCHR ROIC STD --  - [id number]   PLATE ANT ANCHR ROIC STD --    [employer] [id number] N/A 1 Implanted

## 2017-11-15 NOTE — ANESTHESIA POSTPROCEDURE EVALUATION
Post-Anesthesia Evaluation and Assessment    Patient: Gaston Sharpe MRN: [id number]  SSN: [id number]    YOB: 1980  Age: 44 y.o. Sex: female       Cardiovascular Function/Vital Signs  Visit Vitals    BP 98/61    Pulse 68    Temp 36.8 °C (98.3 °F)    Resp 14    Ht 5' 4\" (1.626 m)    Wt 61.5 kg (135 lb 8 oz)    SpO2 94%    BMI 23.26 kg/m2       Patient is status post general anesthesia for Procedure(s):  ACDF C5-6. Nausea/Vomiting: None    Postoperative hydration reviewed and adequate. Pain:  Pain Scale 1: Numeric (0 - 10) (11/15/17 1353)  Pain Intensity 1: 7 (11/15/17 1353)   Managed    Neurological Status:   Neuro (WDL): Within Defined Limits (11/15/17 1218)  Neuro  LUE Motor Response: Purposeful (11/15/17 1218)  LLE Motor Response: Purposeful (11/15/17 1218)  RUE Motor Response: Purposeful (11/15/17 1218)  RLE Motor Response: Purposeful (11/15/17 1218)   At baseline    Mental Status and Level of Consciousness: Arousable    Pulmonary Status:   O2 Device: Nasal cannula (11/15/17 1223)   Adequate oxygenation and airway patent    Complications related to anesthesia: None    Post-anesthesia assessment completed.  No concerns    Signed By: Grabiel Colon MD     November 15, 2017

## 2017-11-15 NOTE — IP AVS SNAPSHOT
Abril Garza 
 
 
 [address] [facility] [address] [facility] 
[phone] Patient: Jasen Liu MRN: [id number] :1980 My Medications TAKE these medications as instructed Instructions Each Dose to Equal  
 Morning Noon Evening Bedtime HYDROmorphone 2 mg tablet Commonly known as:  DILAUDID Your last dose was:   at 2 30 PM  
Your next dose is: Take today if needed after 8 30 PM  
   
 Take 1 Tab by mouth every six (6) hours as needed for Pain. Max Daily Amount: 8 mg.  
 2 mg  
    
   
   
   
  
 ibuprofen 200 mg tablet Commonly known as:  MOTRIN Notes to Patient:  Hold this medication until directed by doctor to resume Take 200 mg by mouth as needed for Pain. 200 mg  
    
   
   
   
  
 oxyCODONE-acetaminophen 7.5-325 mg per tablet Commonly known as:  PERCOCET 7.5 Take 1 Tab by mouth every eight (8) hours as needed for Pain. Max Daily Amount: 3 Tabs. 1 Tab PRE-ZHANNA MULTIVITAMINS/MINERALS PO Your next dose is:  Resume home schedule Take  by mouth. Where to Get Your Medications Information on where to get these meds will be given to you by the nurse or doctor. ! Ask your nurse or doctor about these medications HYDROmorphone 2 mg tablet  
 oxyCODONE-acetaminophen 7.5-325 mg per tablet

## 2017-11-15 NOTE — IP AVS SNAPSHOT
Jose Luis Anthony 
 
 
 [address] [facility] [address] [facility] 
[phone] Patient: Tanna Verduzco MRN: [id number] :1980 About your hospitalization You were admitted on:  November 15, 2017 You last received care in the:  [facility] 7 MED SURG You were discharged on:  2017 Why you were hospitalized Your primary diagnosis was:  Cervical Stenosis Of Spinal Canal  
  
Things You Need To Do (next 8 weeks) Follow up with Elena Gomez NP Phone:  [phone] Where:  [address], [address] [facility] [address], [address] [facility] [address] Follow up with Omar Pizarro MD  
Follow up on  at 9:30 AM. Phone:  [phone] Where:  [place], [address] [employer] [facility], [address] [facility] [address]  WOUND CHECK with [facility] NURSE at  9:30 AM  
Where:  [facility] ([facility]) Discharge Orders None A check nhi indicates which time of day the medication should be taken. My Medications TAKE these medications as instructed Instructions Each Dose to Equal  
 Morning Noon Evening Bedtime HYDROmorphone 2 mg tablet Commonly known as:  DILAUDID Your last dose was:   at 2 30 PM  
Your next dose is: Take today if needed after 8 30 PM  
   
 Take 1 Tab by mouth every six (6) hours as needed for Pain. Max Daily Amount: 8 mg.  
 2 mg  
    
   
   
   
  
 ibuprofen 200 mg tablet Commonly known as:  MOTRIN Notes to Patient:  Hold this medication until directed by doctor to resume Take 200 mg by mouth as needed for Pain. 200 mg  
    
   
   
   
  
 oxyCODONE-acetaminophen 7.5-325 mg per tablet Commonly known as:  PERCOCET 7.5 Take 1 Tab by mouth every eight (8) hours as needed for Pain. Max Daily Amount: 3 Tabs. 1 Tab PRE-ZHANNA MULTIVITAMINS/MINERALS PO Your next dose is:  Resume home schedule Take  by mouth. Where to Get Your Medications Information on where to get these meds will be given to you by the nurse or doctor. ! Ask your nurse or doctor about these medications HYDROmorphone 2 mg tablet  
 oxyCODONE-acetaminophen 7.5-325 mg per tablet Discharge Instructions Soft diet MAY shower. No tub baths LEAVE dressing on incision for 3 DAYS-->then may remove (If dressing starts to fall off may re-secure with tape) NO lifting anything heavier than 5LBS  
 
MAY turn head to the right and left and look down-->MINIMIZE looking upwards Avoid reaching above head Avoid sitting more than 20 - 30 minutes at a time NO driving until directed by your doctor DO NOT take any NSAIDS (either prescribed or over the counter until directed (Aleve, Ibuprofen, Mobic, etc) as this will interfere with bone healing CALL Dr. Aiyana Burns if:  Fever >100.5 
([phone])               Incision becomes red , swollen or opens up Incision has yellow, thick drainage or an odor Pain is not managed with prescribed medications Excessive nausea and/or vomiting Increased difficulty with swallowing Avoid having pets sleep in bed with you until incision is [facility] 
[address] [facility], [address] 
[phone] Cervical (Neck) Fusion Postoperative Home Instructions 
 
- SHOWERING: You may shower the first day you are home with the dressing on. Do not soak in a tub for at least three weeks. If your dressing gets wet, change it according to the written instructions below. Use only soap and water on the incision and pat it dry.  Do not scrub the incision. 
 
- WOUND CARE: A small to moderate amount of reddish drainage on the dressing is normal the first 1-2 days after surgery. If your dressing becomes soaked with drainage, let your doctor know. [facility] HANDS BEFORE AND AFTER INCISION CARE. 
 
- SIGNS OF INFECTION: Please notify your physician for any of the following: a temperature greater than 100.5, extreme tenderness at the wound, excessive redness and/or swelling, or large amounts of drainage from the wound. If you think you have a wound infection, call your physician's office immediately. 
 
- SWALLOWING: Don't be alarmed if it seems there is a lump in your throat for several days after surgery- this is normal. Eat only soft foods and drink plenty of fluids until your throat feels better. If you begin having trouble swallowing, call the office immediately. - DRIVING: You may not begin driving until after your visit to the physician's office for a wound and suture check. This is normally 7-10 days after you come home from the hospital. 
 
- MEDICATIONS: You may not take anti-inflammatory medications. These include over-the-counter ibuprofen products such as Motrin, Advil, Aleve and other related medications or prescription medications such as Ultram, Naproxen, Indocin, or Celebrex and others. These medications slow down the bone healing. You may take Tylenol. The pain medicine prescribed may be taken as needed. You should continue taking a stool softener twice a day, drink plenty of water and eat high fiber foods to avoid constipation. (This is a common problem patients experience while taking pain medicine). - DEEP BREATHING EXERCISES: Continue to use your incentive spirometer for deep breathing exercises. - SMOKING: Smoking will interfere with your healing. If you smoke, you may end up having another surgery or more problems. 
 
- ACTIVITY: Avoid reaching overhead, particularly to lift things, until you have been told that your fusion has healed.  Do not lift objects heavier than a coffee cup or a newspaper. You shouldn't lift anything heavier than 2-5 pounds. When lifting, you should bend with your knees and keep your back straight. Sleeping may be difficult and sometimes requires you to change positions frequently; try to sleep with your head elevated 15-30 degrees. You may turn your head  Gradually from side to side, but avoid placing your chin to your chest or flexing your head backwards. You may remove your LUTHER hose when consistently walking. You may do steps and inclines in moderation. 
 
- SEXUAL RELATIONS: You may resume sexual relations 2 weeks after your surgery. - WALKING PROGRAM: After your sutures are removed or dissolved, it is very important that you begin a progressive walking program. Walking strengthens the spinal muscles and will help protect your disc and vertebrae. You will need to increase your walking program up to two miles per day over the next four weeks. You should begin slowly with 1/8 to 1/4 of a mile and add to this each day. Please be very consistent with your walking program, as this is a vital part of your recovery. 
 
- SYMPTOMS AFTER SURGERY: Don't be alarmed if you still have some of the same symptoms you had prior to surgery. The nerves often require time to heal after the pressure has been relieved. You may also experience pain between your shoulder blades which is common after this surgery. The level of pain you experience should improve as your body heals. - FOLLOW-UP: An appointment will be made for you to follow-up with your surgeon or the office nurse. Please bring any X-rays of your neck to the appointmet. Please call our office if you have any other questions or problems. Listen to your body; it will tell you if you are overdoing it. Use common sense and take care of yourself! After general anesthesia or intravenous sedation, for 24 hours or while taking prescription Narcotics: · Limit your activities · Do not drive and operate hazardous machinery · Do not make important personal or business decisions · Do  not drink alcoholic beverages · If you have not urinated within 8 hours after discharge, please contact your surgeon on call. *  Please give a list of your current medications to your Primary Care Provider. *  Please update this list whenever your medications are discontinued, doses are 
    changed, or new medications (including over-the-counter products) are added. *  Please carry medication information at all times in case of emergency situations. These are general instructions for a healthy lifestyle: No smoking/ No tobacco products/ Avoid exposure to second hand smoke Surgeon General's Warning:  Quitting smoking now greatly reduces serious risk to your health. Obesity, smoking, and sedentary lifestyle greatly increases your risk for illness A healthy diet, regular physical exercise & weight monitoring are important for maintaining a healthy lifestyle You may be retaining fluid if you have a history of heart failure or if you experience any of the following symptoms:  Weight gain of 3 pounds or more overnight or 5 pounds in a week, increased swelling in our hands or feet or shortness of breath while lying flat in bed. Please call your doctor as soon as you notice any of these symptoms; do not wait until your next office visit. Recognize signs and symptoms of STROKE: 
 
F-face looks uneven A-arms unable to move or move unevenly S-speech slurred or non-existent T-time-call 911 as soon as signs and symptoms begin-DO NOT go Back to bed or wait to see if you get better-TIME IS BRAIN. Introducing hospitals & HEALTH SERVICES! [employer] introduces [employer] patient portal. Now you can access parts of your medical record, email your doctor's office, and request medication refills online. 1. In your internet browser, go to [email or web address] (Adhesive) Rash Itching Recent Documentation Height Weight BMI OB Status Smoking Status 1.626 m 61.5 kg 23.26 kg/m2 Having regular periods Never Smoker Emergency Contacts Name Discharge Info Relation Home Work Mobile Ernesto Felix  Spouse [3] [phone] Patient Belongings The following personal items are in your possession at time of discharge: 
  Dental Appliances: None  Visual Aid: Glasses      Home Medications: None   Jewelry: None  Clothing: Footwear, Pants, Shirt, Undergarments    Other Valuables: Purse Please provide this summary of care documentation to your next provider. Signatures-by signing, you are acknowledging that this After Visit Summary has been reviewed with you and you have received a copy. Patient Signature:  ____________________________________________________________ Date:  ____________________________________________________________  
  
[facility] Provider Signature:  ____________________________________________________________ Date:  ____________________________________________________________

## 2017-11-15 NOTE — PERIOP NOTES
TRANSFER - OUT REPORT:    Verbal report given to Farzaneh RN(name) on Val  being transferred to 723(unit) for routine post - op       Report consisted of patients Situation, Background, Assessment and   Recommendations(SBAR). Information from the following report(s) SBAR, OR Summary, Intake/Output and MAR was reviewed with the receiving nurse. Lines:   Peripheral IV 11/15/17 Right Hand (Active)   Site Assessment Clean, dry, & intact 11/15/2017 12:18 PM   Phlebitis Assessment 0 11/15/2017 12:18 PM   Infiltration Assessment 0 11/15/2017 12:18 PM   Dressing Status Clean, dry, & intact 11/15/2017 12:18 PM   Dressing Type Transparent 11/15/2017 12:18 PM   Hub Color/Line Status Pink 11/15/2017 12:18 PM   Alcohol Cap Used No 11/15/2017 12:18 PM        Opportunity for questions and clarification was provided. Patient transported with:   O2 @ 1 liters    VTE prophylaxis orders have been written for Val. Patient and family given floor number and nurses name. Family updated re: pt status after security code verified.

## 2017-11-15 NOTE — PROGRESS NOTES
TRANSFER - IN REPORT:    Verbal report received from Megan(name) on Val  being received from PACU(unit) for routine progression of care      Report consisted of patients Situation, Background, Assessment and   Recommendations(SBAR). Information from the following report(s) SBAR, Kardex, OR Summary, Intake/Output, MAR, Accordion and Recent Results was reviewed with the receiving nurse. Opportunity for questions and clarification was provided. Assessment completed upon patients arrival to unit and care assumed.

## 2017-11-15 NOTE — PROGRESS NOTES
Problem: Falls - Risk of  Goal: *Absence of Falls  Document Kaitlyn Fall Risk and appropriate interventions in the flowsheet.    Outcome: Progressing Towards Goal  Fall Risk Interventions:            Medication Interventions: Bed/chair exit alarm, Evaluate medications/consider consulting pharmacy, Patient to call before getting OOB, Teach patient to arise slowly    Elimination Interventions: Bed/chair exit alarm, Call light in reach, Patient to call for help with toileting needs, Toilet paper/wipes in reach, Toileting schedule/hourly rounds

## 2017-11-15 NOTE — PERIOP NOTES
Patient's family unavailable for surgical update. Update given to waiting  and will be relayed to family upon their return. AARTI Adame @ [id number].

## 2017-11-16 VITALS
BODY MASS INDEX: 23.13 KG/M2 | OXYGEN SATURATION: 99 % | HEART RATE: 92 BPM | WEIGHT: 135.5 LBS | TEMPERATURE: 99.8 F | RESPIRATION RATE: 19 BRPM | DIASTOLIC BLOOD PRESSURE: 74 MMHG | HEIGHT: 64 IN | SYSTOLIC BLOOD PRESSURE: 112 MMHG

## 2017-11-16 PROCEDURE — L0172 CERV COL SR FOAM 2PC PRE OTS: HCPCS

## 2017-11-16 PROCEDURE — 97165 OT EVAL LOW COMPLEX 30 MIN: CPT

## 2017-11-16 PROCEDURE — 97161 PT EVAL LOW COMPLEX 20 MIN: CPT

## 2017-11-16 PROCEDURE — 97530 THERAPEUTIC ACTIVITIES: CPT

## 2017-11-16 PROCEDURE — 74011250637 HC RX REV CODE- 250/637: Performed by: NEUROLOGICAL SURGERY

## 2017-11-16 PROCEDURE — 74011250636 HC RX REV CODE- 250/636: Performed by: NEUROLOGICAL SURGERY

## 2017-11-16 PROCEDURE — 90471 IMMUNIZATION ADMIN: CPT

## 2017-11-16 PROCEDURE — 90686 IIV4 VACC NO PRSV 0.5 ML IM: CPT | Performed by: NEUROLOGICAL SURGERY

## 2017-11-16 RX ORDER — HYDROMORPHONE HYDROCHLORIDE 2 MG/1
2 TABLET ORAL
Qty: 30 TAB | Refills: 0 | Status: SHIPPED | OUTPATIENT
Start: 2017-11-16

## 2017-11-16 RX ADMIN — CEFAZOLIN 2 G: 1 INJECTION, POWDER, FOR SOLUTION INTRAMUSCULAR; INTRAVENOUS; PARENTERAL at 02:19

## 2017-11-16 RX ADMIN — HYDROMORPHONE HYDROCHLORIDE 2 MG: 1 INJECTION, SOLUTION INTRAMUSCULAR; INTRAVENOUS; SUBCUTANEOUS at 07:39

## 2017-11-16 RX ADMIN — HYDROMORPHONE HYDROCHLORIDE 2 MG: 1 INJECTION, SOLUTION INTRAMUSCULAR; INTRAVENOUS; SUBCUTANEOUS at 01:19

## 2017-11-16 RX ADMIN — Medication 10 ML: at 06:08

## 2017-11-16 RX ADMIN — HYDROMORPHONE HYDROCHLORIDE 2 MG: 1 INJECTION, SOLUTION INTRAMUSCULAR; INTRAVENOUS; SUBCUTANEOUS at 14:38

## 2017-11-16 RX ADMIN — OXYCODONE HYDROCHLORIDE AND ACETAMINOPHEN 1 TABLET: 10; 325 TABLET ORAL at 10:42

## 2017-11-16 RX ADMIN — INFLUENZA VIRUS VACCINE 0.5 ML: 15; 15; 15; 15 SUSPENSION INTRAMUSCULAR at 10:42

## 2017-11-16 RX ADMIN — CEFAZOLIN 2 G: 1 INJECTION, POWDER, FOR SOLUTION INTRAMUSCULAR; INTRAVENOUS; PARENTERAL at 10:42

## 2017-11-16 NOTE — PROGRESS NOTES
Spiritual Care visit. Advance Directive Consult attempted.     Visit by Leigh Caba M.Ed., .B. ,Staff

## 2017-11-16 NOTE — PROGRESS NOTES
Problem: Mobility Impaired (Adult and Pediatric)  Goal: *Acute Goals and Plan of Care (Insert Text)  Discharge Goals:    (1.)Ms. Reena Farias will move from supine to sit and sit to supine , scoot up and down and roll side to side in bed with MODIFIED INDEPENDENCE within 3 day(s). Goal Met: 11/16/17  (2.)Ms. Reena Farias will transfer from bed to chair and chair to bed with MODIFIED INDEPENDENCE using the least restrictive device within 3 day(s). Goal Met: 11/16/17  (3.)Ms. Reena Farias will ambulate with MODIFIED INDEPENDENCE for 250 feet with the least restrictive device within 3 day(s). Goal Met: 11/16/17  (4.)Ms. Reena Farias will ascend and descend 5 stairs using 1-2 hand rail(s) with SUPERVISION to improve functional mobility and safety within 3 day(s). Goal Met: 11/16/17    ________________________________________________________________________________________________      PHYSICAL THERAPY: Initial Assessment, Daily Note, Discharge, AM 11/16/2017  OUTPATIENT: Hospital Day: 2  Payor: Shivam Jamil / Plan: [address] [place] [address] / Product Type: Nunu Sera /      NAME/AGE/GENDER: Kenyatta Pedro is a 44 y.o. female   PRIMARY DIAGNOSIS: Cervical spinal stenosis [M48.02]  Cervical radiculopathy [M54.12] Cervical stenosis of spinal canal Cervical stenosis of spinal canal  Procedure(s) (LRB):  ACDF C5-6 (N/A)  1 Day Post-Op  ICD-10: Treatment Diagnosis:   · Difficulty in walking, Not elsewhere classified (R26.2)   Precaution/Allergies:  Latex, natural rubber and Band aid [adhesive]      ASSESSMENT:     Ms. Reena Farias is a 44 y.o. female s/p above surgery. She lives with  and is currently staying in her parent's home while they build a house. She will be returning to a two story home with bedroom on 2nd floor with 5 steps to enter. Supine on contact and agreeable to therapy with cervical collar applied. Patient performed log roll with modified independence, stood with supervision and ambulated into restroom with SBA.  Able to void and perform madeleine care independently. Initially, she required SBA for ambulation, however improved to modified independence for ambulation 250' with no losses of balance. Treatment initiated and patient instructed in safe stair negotiation (unable to perform full flight due to patient's IV) and patient able to perform with supervision and verbal cues. At this time, patient has met all of her current therapy goals and will be d/c from therapy. Discussed post surgery mobility and patient and her mother verbalize understanding. RECOMMENDED REHABILITATION/EQUIPMENT: (at time of discharge pending progress): Due to the probability of continued deficits (see above) this patient will not likely need continued skilled physical therapy after discharge. Equipment:    None at this time              HISTORY:   History of Present Injury/Illness (Reason for Referral):  Cervical spinal stenosis [M48.02]  Cervical radiculopathy [M54.12] Cervical stenosis of spinal canal Cervical stenosis of spinal canal  Procedure(s) (LRB):  ACDF C5-6 (N/A)  1 Day Post-Op  Past Medical History/Comorbidities:   Ms. Corina Ulrich  has a past medical history of Chronic pain; PONV (postoperative nausea and vomiting); and PVC (premature ventricular contraction). Ms. Corina Ulrich  has a past surgical history that includes colonoscopy; thyroidectomy; cholecystectomy; and  section. Social History/Living Environment:   Home Environment: Private residence  # Steps to Enter: 5  Rails to Enter: Yes  Hand Rails : Right  One/Two Story Residence: Two story  # of Interior Steps: 12  Height of Each Step (in): 8 inches  Interior Rails: Left  Living Alone: No  Support Systems: Parent, Spouse/Significant Other/Partner  Patient Expects to be Discharged to[de-identified] Private residence  Current DME Used/Available at Home: None  Prior Level of Function/Work/Activity:  Independent with ambulation, ADLs, owns a business.      Number of Personal Factors/Comorbidities that affect the Plan of Care: 1-2: MODERATE COMPLEXITY   EXAMINATION:   Most Recent Physical Functioning:   Gross Assessment:  AROM: Within functional limits  PROM: Within functional limits  Strength: Within functional limits  Coordination: Within functional limits  Tone: Normal  Sensation: Intact               Posture:  Posture (WDL): Exceptions to WDL  Posture Assessment: Forward head  Balance:    Bed Mobility:     Wheelchair Mobility:     Transfers:     Gait:     Base of Support: Narrowed  Distance (ft): 250 Feet (ft)  Assistive Device:  (none)  Ambulation - Level of Assistance: Modified independent  Number of Stairs Trained: 5  Stairs - Level of Assistance: Supervision  Rail Use: Both  Interventions: Safety awareness training;Manual cues; Verbal cues      Body Structures Involved:  1. Nerves  2. Bones  3. Joints  4. Muscles  5. Ligaments Body Functions Affected:  1. Sensory/Pain  2. Neuromusculoskeletal  3. Movement Related Activities and Participation Affected:  1. Mobility  2. Community, Social and [place]   Number of elements that affect the Plan of Care: 4+: HIGH COMPLEXITY   CLINICAL PRESENTATION:   Presentation: Stable and uncomplicated: LOW COMPLEXITY   CLINICAL DECISION MAKIN [facility] Inpatient Short Form  How much difficulty does the patient currently have. .. Unable A Lot A Little None   1. Turning over in bed (including adjusting bedclothes, sheets and blankets)? [] 1   [] 2   [] 3   [x] 4   2. Sitting down on and standing up from a chair with arms ( e.g., wheelchair, bedside commode, etc.)   [] 1   [] 2   [] 3   [x] 4   3. Moving from lying on back to sitting on the side of the bed? [] 1   [] 2   [] 3   [x] 4   How much help from another person does the patient currently need. .. Total A Lot A Little None   4. Moving to and from a bed to a chair (including a wheelchair)? [] 1   [] 2   [] 3   [x] 4   5. Need to walk in hospital room?    [] 1   [] 2   [] 3   [x] 4 6.  Climbing 3-5 steps with a railing? [] 1   [] 2   [] 3   [x] 4   © 2007, Trustees of [facility] [employer], under license to [employer]. All rights reserved      Score:  Initial: 24 Most Recent: X (Date: -- )    Interpretation of Tool:  Represents activities that are increasingly more difficult (i.e. Bed mobility, Transfers, Gait). Score 24 23 22-20 19-15 14-10 9-7 6     Modifier CH CI CJ CK CL CM CN      ? Mobility - Walking and Moving Around:     - CURRENT STATUS: CH - 0% impaired, limited or restricted    - GOAL STATUS: CH - 0% impaired, limited or restricted    - D/C STATUS:  CH - 0% impaired, limited or restricted  Payor: BLUE CROSS / Plan: [place] [address]arth Porter / Product Type: BENITO /       Use of outcome tool(s) and clinical judgement create a POC that gives a: Clear prediction of patient's progress: LOW COMPLEXITY            TREATMENT:   (In addition to Assessment/Re-Assessment sessions the following treatments were rendered)   Pre-treatment Symptoms/Complaints:  \"Wooziness,\" RN notified. Pain: Initial:   Pain Intensity 1: 2  Post Session:  2/10     Therapeutic Activity: (    8 minutes): Therapeutic activities including Ambulation on level ground and Stairs to improve mobility, strength, balance and coordination. Required minimal Safety awareness training;Manual cues; Verbal cues to promote static and dynamic balance in standing. Braces/Orthotics/Lines/Etc:   · IV  · Cervical collar  · O2 Device: Room air  Treatment/Session Assessment:    · Response to Treatment:  Patient tolerated treatment well with c/o \"wooziness. \"  · Interdisciplinary Collaboration:   o Physical Therapist  o Registered Nurse  · After treatment position/precautions:   o Supine in bed  o Bed/Chair-wheels locked  o Bed in low position  o Call light within reach  o RN notified  o Family at bedside   · Compliance with Program/Exercises: Will assess as treatment progresses.   · Recommendations/Intent for next treatment session: \"Next visit will focus on advancements to more challenging activities and reduction in assistance provided\".   Total Treatment Duration:  PT Patient Time In/Time Out  Time In: 0900  Time Out: 0930    Haseeb Marti DPT

## 2017-11-16 NOTE — DISCHARGE INSTRUCTIONS
Soft diet    MAY shower. No tub baths    LEAVE dressing on incision for 3 DAYS-->then may remove   (If dressing starts to fall off may re-secure with tape)    NO lifting anything heavier than 5LBS     MAY turn head to the right and left and look down-->MINIMIZE looking upwards    Avoid reaching above head    Avoid sitting more than 20 - 30 minutes at a time    NO driving until directed by your doctor    DO NOT take any NSAIDS (either prescribed or over the counter until directed    (Aleve, Ibuprofen, Mobic, etc) as this will interfere with bone healing    CALL Dr. Hai Nix if:  Fever >100.5  ([phone])               Incision becomes red , swollen or opens up             Incision has yellow, thick drainage or an odor             Pain is not managed with prescribed medications             Excessive nausea and/or vomiting                                   Increased difficulty with swallowing    Avoid having pets sleep in bed with you until incision is completely healedSoteroPhoebe [facility]ney [facility], P.ACarlos [address], [facility], [address] [facility]  [phone]    Cervical (Neck) Fusion Postoperative Home Instructions    - SHOWERING: You may shower the first day you are home with the dressing on. Do not soak in a tub for at least three weeks. If your dressing gets wet, change it according to the written instructions below. Use only soap and water on the incision and pat it dry. Do not scrub the incision.    - WOUND CARE: A small to moderate amount of reddish drainage on the dressing is normal the first 1-2 days after surgery. If your dressing becomes soaked with drainage, let your doctor know. [facility] HANDS BEFORE AND AFTER INCISION CARE.    - SIGNS OF INFECTION: Please notify your physician for any of the following: a temperature greater than 100.5, extreme tenderness at the wound, excessive redness and/or swelling, or large amounts of drainage from the wound.  If you think you have a wound infection, call your physician's office immediately.    - SWALLOWING: Don't be alarmed if it seems there is a lump in your throat for several days after surgery- this is normal. Eat only soft foods and drink plenty of fluids until your throat feels better. If you begin having trouble swallowing, call the office immediately. - DRIVING: You may not begin driving until after your visit to the physician's office for a wound and suture check. This is normally 7-10 days after you come home from the hospital.    - MEDICATIONS: You may not take anti-inflammatory medications. These include over-the-counter ibuprofen products such as Motrin, Advil, Aleve and other related medications or prescription medications such as Ultram, Naproxen, Indocin, or Celebrex and others. These medications slow down the bone healing. You may take Tylenol. The pain medicine prescribed may be taken as needed. You should continue taking a stool softener twice a day, drink plenty of water and eat high fiber foods to avoid constipation. (This is a common problem patients experience while taking pain medicine). - DEEP BREATHING EXERCISES: Continue to use your incentive spirometer for deep breathing exercises. - SMOKING: Smoking will interfere with your healing. If you smoke, you may end up having another surgery or more problems.    - ACTIVITY: Avoid reaching overhead, particularly to lift things, until you have been told that your fusion has healed. Do not lift objects heavier than a coffee cup or a newspaper. You shouldn't lift anything heavier than 2-5 pounds. When lifting, you should bend with your knees and keep your back straight. Sleeping may be difficult and sometimes requires you to change positions frequently; try to sleep with your head elevated 15-30 degrees. You may turn your head  Gradually from side to side, but avoid placing your chin to your chest or flexing your head backwards. You may remove your LUTHER hose when consistently walking.  You may do steps and inclines in moderation.    - SEXUAL RELATIONS: You may resume sexual relations 2 weeks after your surgery. - WALKING PROGRAM: After your sutures are removed or dissolved, it is very important that you begin a progressive walking program. Walking strengthens the spinal muscles and will help protect your disc and vertebrae. You will need to increase your walking program up to two miles per day over the next four weeks. You should begin slowly with 1/8 to 1/4 of a mile and add to this each day. Please be very consistent with your walking program, as this is a vital part of your recovery.    - SYMPTOMS AFTER SURGERY: Don't be alarmed if you still have some of the same symptoms you had prior to surgery. The nerves often require time to heal after the pressure has been relieved. You may also experience pain between your shoulder blades which is common after this surgery. The level of pain you experience should improve as your body heals. - FOLLOW-UP: An appointment will be made for you to follow-up with your surgeon or the office nurse. Please bring any X-rays of your neck to the appointmet. Please call our office if you have any other questions or problems. Listen to your body; it will tell you if you are overdoing it. Use common sense and take care of yourself! After general anesthesia or intravenous sedation, for 24 hours or while taking prescription Narcotics:  · Limit your activities  · Do not drive and operate hazardous machinery  · Do not make important personal or business decisions  · Do  not drink alcoholic beverages  · If you have not urinated within 8 hours after discharge, please contact your surgeon on call. *  Please give a list of your current medications to your Primary Care Provider. *  Please update this list whenever your medications are discontinued, doses are      changed, or new medications (including over-the-counter products) are added.   *  Please carry medication information at all times in case of emergency situations. These are general instructions for a healthy lifestyle:  No smoking/ No tobacco products/ Avoid exposure to second hand smoke  Surgeon General's Warning:  Quitting smoking now greatly reduces serious risk to your health. Obesity, smoking, and sedentary lifestyle greatly increases your risk for illness  A healthy diet, regular physical exercise & weight monitoring are important for maintaining a healthy lifestyle    You may be retaining fluid if you have a history of heart failure or if you experience any of the following symptoms:  Weight gain of 3 pounds or more overnight or 5 pounds in a week, increased swelling in our hands or feet or shortness of breath while lying flat in bed. Please call your doctor as soon as you notice any of these symptoms; do not wait until your next office visit. Recognize signs and symptoms of STROKE:    F-face looks uneven  A-arms unable to move or move unevenly  S-speech slurred or non-existent  T-time-call 911 as soon as signs and symptoms begin-DO NOT go       Back to bed or wait to see if you get better-TIME IS BRAIN.

## 2017-11-16 NOTE — PROGRESS NOTES
Problem: Self Care Deficits Care Plan (Adult)  Goal: *Acute Goals and Plan of Care (Insert Text)    OCCUPATIONAL THERAPY: Initial Assessment and Discharge 11/16/2017  OUTPATIENT: Hospital Day: 2  Payor: BLUE CROSS / [place] [employer] / Product Type: BENITO /      NAME/AGE/GENDER: Helena Rees is a 44 y.o. female   PRIMARY DIAGNOSIS:  Cervical spinal stenosis [M48.02]  Cervical radiculopathy [M54.12] Cervical stenosis of spinal canal Cervical stenosis of spinal canal  Procedure(s) (LRB):  ACDF C5-6 (N/A)  1 Day Post-Op  ICD-10: Treatment Diagnosis:    · Cervicalgia (M54.2)   Precautions/Allergies:    C spinal precautions  C collar    Latex, natural rubber and Band aid [adhesive]      ASSESSMENT:     Ms. Milo Arellano is a 44year old female who is now s/p above procedure. At baseline patient lives with spouse and 2 small children. She is typically independent with ADLs and IADLs. She works at a dance studio. Patient agreeable to OT evaluation. Reports pain 5/10 in neck. She has a good understanding of spinal precautions and log roll technique. BUE are WFL for ROM, strength, coordination, sensation. Appears to be functioning at modified independent level for ADLs. No additional OT needs. Discharge OT. This section established at most recent assessment   PROBLEM LIST (Impairments causing functional limitations):  1. None   INTERVENTIONS PLANNED: (Benefits and precautions of occupational therapy have been discussed with the patient.)  1. None          RECOMMENDED REHABILITATION/EQUIPMENT: (at time of discharge pending progress): Due to the probability of continued deficits (see above) this patient will not likely need continued skilled occupational therapy after discharge.   Equipment:    None at this time              OCCUPATIONAL PROFILE AND HISTORY:   History of Present Injury/Illness (Reason for Referral):  S/p above procedure   Past Medical History/Comorbidities:   Ms. Milo Arellano  has a past medical history of Chronic pain; PONV (postoperative nausea and vomiting); and PVC (premature ventricular contraction). Ms. Rosa Cedillo  has a past surgical history that includes colonoscopy; thyroidectomy; cholecystectomy; and  section. Social History/Living Environment:   Home Environment: Private residence  # Steps to Enter: 5  Rails to Enter: Yes  Hand Rails : Right  One/Two Story Residence: Two story  # of Interior Steps: 12  Height of Each Step (in): 8 inches  Interior Rails: Left  Living Alone: No  Support Systems: Parent, Spouse/Significant Other/Partner  Patient Expects to be Discharged to[de-identified] Private residence  Current DME Used/Available at Home: None  Prior Level of Function/Work/Activity:  Patient lives with her spouse and 2 kids ages 1 and 10. Independent with ADLs. Drives. Works at a dance studio. Dominant Side:         RIGHT   Number of Personal Factors/Comorbidities that affect the Plan of Care: Brief history (0):  LOW COMPLEXITY   ASSESSMENT OF OCCUPATIONAL PERFORMANCE[de-identified]   Activities of Daily Living:           Basic ADLs (From Assessment) Complex ADLs (From Assessment)   Basic ADL  Feeding: Modified independent  Oral Facial Hygiene/Grooming: Modified Independent  Bathing: Modified independent  Upper Body Dressing: Modified independent  Lower Body Dressing: Modified independent  Toileting: Modified independent Instrumental ADL  Meal Preparation: Minimum assistance  Homemaking: Minimum assistance  Medication Management: Independent  Financial Management: Independent   Grooming/Bathing/Dressing Activities of Daily Living     Cognitive Retraining  Safety/Judgement: Awareness of environment; Fall prevention                       Bed/Mat Mobility  Supine to Sit: Modified independent  Sit to Supine: Modified independent  Sit to Stand: Independent       Most Recent Physical Functioning:   Gross Assessment:  AROM: Within functional limits (BUE)  Strength:  Within functional limits (BUE) Posture:  Posture (WDL): Exceptions to WDL  Posture Assessment: Forward head  Balance:  Sitting: Intact  Standing: Intact Bed Mobility:  Supine to Sit: Modified independent  Sit to Supine: Modified independent  Wheelchair Mobility:     Transfers:  Sit to Stand: Independent  Stand to Sit: Independent                Patient Vitals for the past 6 hrs:   BP BP Patient Position SpO2 Pulse   17 1140 98/63 At rest 90 % 91   17 1513 112/74 Sitting 99 % 92       Mental Status  Neurologic State: Alert  Orientation Level: Oriented X4  Cognition: Follows commands, Appropriate decision making  Perception: Appears intact  Perseveration: No perseveration noted  Safety/Judgement: Awareness of environment, Fall prevention                          Physical Skills Involved:  1. Pain (acute) Cognitive Skills Affected (resulting in the inability to perform in a timely and safe manner):  1. None Psychosocial Skills Affected:  1. Habits/Routines  2. Environmental Adaptation  3. Social Roles   Number of elements that affect the Plan of Care: 3-5:  MODERATE COMPLEXITY   CLINICAL DECISION MAKIN[address] [place] [id number] AM-PAC 6 Clicks   Daily Activity Inpatient Short Form  How much help from another person does the patient currently need. .. Total A Lot A Little None   1. Putting on and taking off regular lower body clothing? [] 1   [] 2   [] 3   [x] 4   2. Bathing (including washing, rinsing, drying)? [] 1   [] 2   [] 3   [x] 4   3. Toileting, which includes using toilet, bedpan or urinal?   [] 1   [] 2   [] 3   [x] 4   4. Putting on and taking off regular upper body clothing? [] 1   [] 2   [] 3   [x] 4   5. Taking care of personal grooming such as brushing teeth? [] 1   [] 2   [] 3   [x] 4   6. Eating meals? [] 1   [] 2   [] 3   [x] 4   © , Trustees of [address] [place] [id number], under license to [employer].  All rights reserved      Score:  Initial: 24 Most Recent: X (Date: -- )    Interpretation of Tool: Represents activities that are increasingly more difficult (i.e. Bed mobility, Transfers, Gait). Score 24 23 22-20 19-15 14-10 9-7 6     Modifier CH CI CJ CK CL CM CN      ?  Self Care:     - CURRENT STATUS: CH - 0% impaired, limited or restricted    - GOAL STATUS: CH - 0% impaired, limited or restricted    - D/C STATUS:  [address] [place] [address] - 0% impaired, limited or restricted  Payor: BLUE CROSS / Plan: [place] [address] / Product Type: BENITO /         Use of outcome tool(s) and clinical judgement create a POC that gives a: LOW COMPLEXITY         TREATMENT:   (In addition to Assessment/Re-Assessment sessions the following treatments were rendered)     Pre-treatment Symptoms/Complaints:  none  Pain: Initial:   Pain Intensity 1: 5  Pain Location 1: Neck  Post Session:  same     Assessment/Reassessment only, no treatment provided today    Braces/Orthotics/Lines/Etc:   · IV  · O2 Device: Room air   · Ccollar   Treatment/Session Assessment:    · Response to Treatment:  Tolerated well   · Interdisciplinary Collaboration:   o Occupational Therapist  o Registered Nurse  · After treatment position/precautions:   o sitting edge of bed wtih RN      Total Treatment Duration:  OT Patient Time In/Time Out  Time In: 1504  Time Out: [id number] Cár[place]as, OTR/L

## 2018-01-22 ENCOUNTER — HOSPITAL ENCOUNTER (OUTPATIENT)
Dept: GENERAL RADIOLOGY | Age: 38
Discharge: HOME | End: 2018-01-22
Payer: SELF-PAY

## 2018-01-22 DIAGNOSIS — M48.02 CERVICAL STENOSIS OF SPINAL CANAL: ICD-10-CM

## 2018-01-22 PROCEDURE — 72040 X-RAY EXAM NECK SPINE 2-3 VW: CPT

## 2018-02-16 NOTE — PERIOP NOTES
Spoke with Dr. Anum Haskins regarding admission related to pt's pain. Dr. Anum Haskins agreeable. Pt's family at bedside. aerobic capacity/endurance/muscle strength/gait, locomotion, and balance

## 2018-03-27 ENCOUNTER — HOSPITAL ENCOUNTER (OUTPATIENT)
Dept: GENERAL RADIOLOGY | Age: 38
Discharge: HOME | End: 2018-03-27
Payer: SELF-PAY

## 2018-03-27 DIAGNOSIS — M48.02 CERVICAL STENOSIS OF SPINAL CANAL: ICD-10-CM

## 2018-03-27 PROCEDURE — 72040 X-RAY EXAM NECK SPINE 2-3 VW: CPT

## 2018-06-28 PROBLEM — G25.3 MYOCLONUS: Status: ACTIVE | Noted: 2018-06-28

## 2022-03-18 PROBLEM — G25.3 MYOCLONUS: Status: ACTIVE | Noted: 2018-06-28

## 2022-03-19 PROBLEM — M54.12 CERVICAL RADICULOPATHY: Status: ACTIVE | Noted: 2017-10-31

## 2022-03-19 PROBLEM — M48.02 CERVICAL STENOSIS OF SPINAL CANAL: Status: ACTIVE | Noted: 2017-10-31

## (undated) DEVICE — BLADE ASSEMB CLP HAIR FINE --

## (undated) DEVICE — CATHETER IV NDL L1.25IN 16GA GRY POLYUR WNG HUB DISP FOR

## (undated) DEVICE — GOWN,PREVENTION PLUS,2XL,ST,22/CS: Brand: MEDLINE

## (undated) DEVICE — INTENDED FOR TISSUE SEPARATION, AND OTHER PROCEDURES THAT REQUIRE A SHARP SURGICAL BLADE TO PUNCTURE OR CUT.: Brand: BARD-PARKER SAFETY BLADES SIZE 15, STERILE

## (undated) DEVICE — SOLUTION IV 1000ML 0.9% SOD CHL

## (undated) DEVICE — SUTURE VCRL + SZ 3-0 L18IN ABSRB UD SH 1/2 CIR TAPERCUT NDL VCP864D

## (undated) DEVICE — REM POLYHESIVE ADULT PATIENT RETURN ELECTRODE: Brand: VALLEYLAB

## (undated) DEVICE — STANDARD HYPODERMIC NEEDLE,POLYPROPYLENE HUB: Brand: MONOJECT

## (undated) DEVICE — DRAPE XR C ARM 41X74IN LF --

## (undated) DEVICE — KIT HEMSTAT MTRX 8ML PORCINE GEL HUM THROM ABSRB FLOWABLE [2993] [JNJ HEALTHCARE]

## (undated) DEVICE — AGENT HEMSTAT W3XL4IN OXIDIZED REGENERATED CELOS ABSRB FOR

## (undated) DEVICE — MEDI-VAC NON-CONDUCTIVE SUCTION TUBING: Brand: CARDINAL HEALTH

## (undated) DEVICE — DERMABOND SKIN ADH 0.7ML -- DERMABOND ADVANCED 12/BX

## (undated) DEVICE — CASPAR DISTR PIN12MMSTER: Brand: AESCULAP

## (undated) DEVICE — PACK PROCEDURE SURG POST LAMINECTOMY CDS

## (undated) DEVICE — NDL SPNE QNCKE 18GX3.5IN LF --

## (undated) DEVICE — Z CONVERTED USE 2421973 CONTAINER SPEC 60/30ML 10% FRMLN POLYPR PREFIL

## (undated) DEVICE — TOOL 14MH30 LEGEND 14CM 3MM: Brand: MIDAS REX ™

## (undated) DEVICE — (D)PREP SKN CHLRAPRP APPL 26ML -- CONVERT TO ITEM 371833

## (undated) DEVICE — WAX SURG 2.5GM HEMSTAT BNE BEESWAX PARAFFIN ISO PALMITATE

## (undated) DEVICE — KENDALL SCD EXPRESS SLEEVES, KNEE LENGTH, MEDIUM: Brand: KENDALL SCD

## (undated) DEVICE — INSULATED BLADE ELECTRODE: Brand: EDGE

## (undated) DEVICE — SYR 3ML LL TIP 1/10ML GRAD --

## (undated) DEVICE — KARLIN BLADE, ULTRA KNIFE, SATIN, STAINLESS STEEL, 1.5 MM TIP, 4 IN, SINGLE USE, (10/PK): Brand: SYMMETRY SURGICAL

## (undated) DEVICE — (D)SYR 10ML 1/5ML GRAD NSAF -- PKGING CHANGE USE ITEM 338027